# Patient Record
Sex: FEMALE | Race: WHITE | NOT HISPANIC OR LATINO | Employment: FULL TIME | URBAN - METROPOLITAN AREA
[De-identification: names, ages, dates, MRNs, and addresses within clinical notes are randomized per-mention and may not be internally consistent; named-entity substitution may affect disease eponyms.]

---

## 2017-03-10 ENCOUNTER — ALLSCRIPTS OFFICE VISIT (OUTPATIENT)
Dept: OTHER | Facility: OTHER | Age: 37
End: 2017-03-10

## 2017-03-16 LAB
HPV 18 (HISTORICAL): NOT DETECTED
HPV HIGH RISK 16/18 (HISTORICAL): NOT DETECTED
HPV16 (HISTORICAL): NOT DETECTED
PAP (HISTORICAL): NORMAL

## 2018-01-11 NOTE — PROGRESS NOTES
Plan  PMH: Cervical Loop Electrosurgical Excision (LEEP), Encounter for gynecological  examination without abnormal finding, Encounter for screening for human  papillomavirus (HPV), PMH: Severe dysplasia of cervix (JESE III)    · (B) PAP WITH HPV PLUS; Status: In Progress - Specimen/Data Collected,Retrospective  Authorization;   Done: [id number]   Perform:[employer]; Due:10Mar2018; Last Updated Gokul Blunt; 3/10/2017 9:53:10 AM;Ordered; [address]: Cervical Loop Electrosurgical Excision (LEEP), Encounter for gynecological examination without abnormal finding, Encounter for screening for human papillomavirus (HPV), PMH: Severe dysplasia of cervix (JESE III); Ordered By:Chantel May Marker;  : 2/28/2017    Discussion/Summary  health maintenance visit healthy adult female Currently, she eats a healthy diet and eats an adequate diet  the risks and benefits of cervical cancer screening were discussed cervical cancer screening is needed every year History of LEEP for cervical dysplasia Breast cancer screening: the risks and benefits of breast cancer screening were discussed and self breast exam technique was taught  Advice and education were given regarding nutrition, aerobic exercise, weight bearing exercise, calcium supplements and vitamin D supplements  Patient is a 58-year-old female, para 1, condoms for birth control, here for yearly gynecologic exam without abnormal findings  She has a history of a LEEP for cervical dysplasia  Pap with HPV was performed today  Patient has her hidradenitis under good control  Return in 12 months  Chief Complaint  Yearly-no complaints  History of Present Illness  HPI: Patient is a 58-year-old female, P1, here for yearly gynecologic exam with a complaint  Patient has a maternal aunt with postmenopausal breast cancer  GYN , Adult Female [facility]: The patient is being seen for a gynecology evaluation  General Health:   Lifestyle:  She exercises regularly   She exercises less than three times a week  She uses tobacco  The patient is a current cigarette smoker  Tobacco Use Duration: 0 5 cigarette pack(s) per day  She consumes alcohol  She reports occasional alcohol use  She typically drinks beer and wine  She denies drug use  Reproductive health:  she reports no menstrual problems  she uses contraception  For contraception, she uses condoms  she is sexually active  pregnancy history: G 3P 1, 2  Screening: Cervical cancer screening includes a pap smear performed 2016, no previous human papilloma virus screening and negative  Active Problems    1  Encounter for gynecological examination without abnormal finding (V72 31) (Z01 419)   2  Hidradenitis suppurativa (705 83) (L73 2)   3   Routine Gynecological Exam With Cervical Pap Smear (V72 31)    Past Medical History    · History of  (637 90)   · History of JESE III with severe dysplasia (233 1) (D06 9)   · History of Dysplasia of cervix, low grade (JESE 1) (622 11) (N87 0)   · Hidradenitis suppurativa (705 83) (L73 2)   · History of headache (V13 89) (G80 916)   · History of vacuum extraction assisted delivery (V45 89) (Z98 890)   · History of Mastodynia (611 71) (N64 4)   · History of Summary Of Previous Pregnancies  3  (Total No )   · History of  (spontaneous vaginal delivery) (650) (O80)    Surgical History    · History of Cervical Loop Electrosurgical Excision (LEEP)   · History of Colposcopy Cervix With Biopsy(S)   · History of Hand Surgery   · History of Oral Surgery Tooth Extraction   · History of Surgically Induced  - By Dilation And Evacuation    Family History  Mother    · Family history of hypertension (V17 49) (Z82 49)   · Family history of myocardial infarction (V17 3) (Z82 49)   · Family history of varicose veins (V17 49) (Z82 49)   · Family history of Stroke Syndrome (V17 1)  Child    · Family history of Shoulder dystocia  Maternal Grandmother    · Family history of  · Family history of Alzheimer's disease (V17 2) (Z82 0)   · Family history of dementia (V17 2) (Z81 8)   · Family history of varicose veins (V17 49) (Z82 49)  Maternal Aunt    · Family history of Breast Cancer (V16 3)   · Family history of Malignant Melanoma Of The Skin (V16 8)    Social History    · Being A Social Drinker   · Currently sexually active   · Drinks beer (V49 89) (Z78 9)   · Drinks coffee   · 1-2 cups a day   · Drinks wine (V49 89) (Z78 9)   · Denied: History of Drug Use   · Smokes 1/2 pack a day or less (305 1) (F17 210)   · Uses condoms    Current Meds   1  BP Gel 5 % External Gel; APPLY SPARINGLY ONCE DAILY; Therapy: 16HPF0925 to (Evaluate:24Mar2017)  Requested for: 03 91 12 17 13; Last   Rx:[id number] Ordered    Allergies    1  Sulfa Drugs    Vitals   Recorded: 95ACW2756 69:47ZN   Systolic 600, LUE, Sitting   Diastolic 70, LUE, Sitting   Height 5 ft 6 3 in   Weight 150 lb    BMI Calculated 23 99   BSA Calculated 1 78   LMP [id number]     Physical Exam    Constitutional   General appearance: No acute distress, well appearing and well nourished  Neck   Neck: Normal, supple, trachea midline, no masses  Thyroid: Normal, no thyromegaly  Pulmonary   Respiratory effort: No increased work of breathing or signs of respiratory distress  Auscultation of lungs: Clear to auscultation  Cardiovascular   Auscultation of heart: Normal rate and rhythm, normal S1 and S2, no murmurs  Peripheral vascular exam: Normal pulses Throughout  Genitourinary   External genitalia: Normal and no lesions appreciated  Vagina: Normal, no lesions or dryness appreciated  Urethra: Normal     Urethral meatus: Normal     Bladder: Normal, soft, non-tender and no prolapse or masses appreciated  Cervix: Normal, no palpable masses  Uterus: Normal, non-tender, not enlarged, and no palpable masses  Adnexa/parametria: Normal, non-tender and no fullness or masses appreciated      Anus, perineum, and rectum: Normal sphincter tone, no masses, and no prolapse  Chest   Breasts: Normal and no dimpling or skin changes noted  Abdomen   Abdomen: Normal, non-tender, and no organomegaly noted  Liver and spleen: No hepatomegaly or splenomegaly  Examination for hernias: No hernias appreciated  Stool sample for occult blood: Negative  Lymphatic   Palpation of lymph nodes in neck, axillae, groin and/or other locations: No lymphadenopathy or masses noted  Skin   Skin and subcutaneous tissue: Normal skin turgor and no rashes      Palpation of skin and subcutaneous tissue: Normal     Psychiatric   Orientation to person, place, and time: Normal     Mood and affect: Normal        Signatures   Electronically signed by : JACQUELINE Sol ; Mar 10 2017 10:08AM EST                       (Author)

## 2018-01-12 NOTE — PROGRESS NOTES
Assessment    1  Encounter for gynecological examination without abnormal finding (V72 31) (Z01 419)    Plan  PMH: Cervical Loop Electrosurgical Excision (LEEP), Encounter for gynecological  examination without abnormal finding    · (Q) THINPREP PAP; Status:Active - Retrospective Authorization; Requested  ELSIE:[id number];    Perform:[employer]; MALINDAZ:[id number]; Last Updated Abdirashid Hunt; 1/27/2016 2:12:37 PM;Ordered; [address]: Cervical Loop Electrosurgical Excision (LEEP), Encounter for gynecological examination without abnormal finding; Ordered By:Macy May;    Discussion/Summary  health maintenance visit healthy adult female Currently, she eats a healthy diet and eats an adequate diet  the risks and benefits of cervical cancer screening were discussed cervical cancer screening is current Pap test with reflex HPV testing was done today Breast cancer screening: the risks and benefits of breast cancer screening were discussed and self breast exam technique was taught  Advice and education were given regarding nutrition, aerobic exercise, weight bearing exercise, calcium supplements and vitamin D supplements  GYNECOLOGIC YEARLY WITHOUT ABNORMAL FINDINGS, PAP WITH REFLEX DONE, PATIENT DECLINES BIRTH CONTROL, RETURN IN 12 MONTHS  Chief Complaint  Yearly-no complaints  History of Present Illness  HPI: 37 YO P2, CONDOMS FOR BIRTH CONTROL, USING WICH ROCÍO FOR HER HYDRADINITIS AND BENZOIL PEROXIDE AND THAT WORKS WELL  CLINDAMYCIN IS TOO EXPENSIVE  GYN HM, Adult Female [address] [facility]: The patient is being seen for a gynecology evaluation  General Health:   Lifestyle:  She exercises regularly  She exercises less than three times a week physical therapy  She uses tobacco  The patient is a current cigarette smoker  Tobacco Use Duration: 0 5 cigarette pack(s) per day  She consumes alcohol  She reports occasional alcohol use  She typically drinks beer and wine  She denies drug use  Reproductive health: Tiago Zhao she reports no menstrual problems  she uses contraception  For contraception, she uses condoms  she is sexually active  pregnancy history: G 3P 1, 2(elective abortions: 1 )  Screening: Cervical cancer screening includes a pap smear performed 2014, human papilloma virus screening performed  and negative  Review of Systems  no pelvic pain, no pelvic pressure, no vaginal pain, no vaginal discharge, no vaginal itching, no vaginal lump or mass, no vaginal odor and no nonmenstrual bleeding  Active Problems    1  Hidradenitis suppurativa (705 83) (L73 2)   2   Routine Gynecological Exam With Cervical Pap Smear (V72 31)    Past Medical History    · History of  (637 90)   · History of JESE III with severe dysplasia (233 1) (D06 9)   · History of Dysplasia of cervix, low grade (JESE 1) (622 11) (N87 0)   · Hidradenitis suppurativa (705 83) (L73 2)   · History of headache (V13 89) (N09 243)   · History of vacuum extraction assisted delivery (V45 89) (Z98 89)   · History of Mastodynia (611 71) (N64 4)   · History of Summary Of Previous Pregnancies  3  (Total No )   · History of  (spontaneous vaginal delivery) (650) (O80)    Surgical History    · History of Cervical Loop Electrosurgical Excision (LEEP)   · History of Colposcopy Cervix With Biopsy(S)   · History of Hand Surgery   · History of Oral Surgery Tooth Extraction   · History of Surgically Induced  - By Dilation And Evacuation    Family History    · Family history of hypertension (V17 49) (Z82 49)   · Family history of myocardial infarction (V17 3) (Z82 49)   · Family history of varicose veins (V17 49) (Z82 49)   · Family history of Stroke Syndrome (V17 1)    · Family history of Shoulder dystocia    · Family history of    · Family history of Alzheimer's disease (V17 2) (Z82 0)   · Family history of dementia (V17 2) (Z81 8)   · Family history of varicose veins (V17 49) (Z82 49)    · Family history of Breast Cancer (V16 3)   · Family history of Malignant Melanoma Of The Skin (V16 8)    Social History    · Being A Social Drinker   · Currently sexually active   · Drinks beer (V49 89) (Z78 9)   · Drinks coffee   · 1-2 cups a day   · Drinks wine (V49 89) (Z78 9)   · Denied: History of Drug Use   · Smokes 1/2 pack a day or less (305 1) (F17 210)   · Uses condoms    Current Meds   1  Benzoyl Peroxide 5 % External Gel; APPLY SPARINGLY TO AFFECTED AREA(S) ONCE   DAILY; Therapy: 67BRM7822 to (Evaluate:08Jan2016)  Requested for: 57GIZ9119; Last   Rx:13Jan2015 Ordered    Allergies    1  Sulfa Drugs    Vitals   Recorded: 10WGV3928 50:35ZI   Systolic 931, RUE, Sitting   Diastolic 64, RUE, Sitting   Height 5 ft 6 in   Weight 128 lb    BMI Calculated 20 66   BSA Calculated 1 65   LMP [id number]     Physical Exam    Constitutional   General appearance: No acute distress, well appearing and well nourished  Neck   Neck: Normal, supple, trachea midline, no masses  Thyroid: Normal, no thyromegaly  Pulmonary   Respiratory effort: No increased work of breathing or signs of respiratory distress  Auscultation of lungs: Clear to auscultation  Cardiovascular   Auscultation of heart: Normal rate and rhythm, normal S1 and S2, no murmurs  Peripheral vascular exam: Normal pulses Throughout  Genitourinary   External genitalia: Normal and no lesions appreciated  Vagina: Normal, no lesions or dryness appreciated  Urethra: Normal     Urethral meatus: Normal     Bladder: Normal, soft, non-tender and no prolapse or masses appreciated  Cervix: Normal, no palpable masses  Uterus: Normal, non-tender, not enlarged, and no palpable masses  Adnexa/parametria: Normal, non-tender and no fullness or masses appreciated  Chest   Breasts: Normal and no dimpling or skin changes noted  Abdomen   Abdomen: Normal, non-tender, and no organomegaly noted  Liver and spleen: No hepatomegaly or splenomegaly      Examination for hernias: No hernias appreciated  Lymphatic   Palpation of lymph nodes in neck, axillae, groin and/or other locations: No lymphadenopathy or masses noted  Skin   Skin and subcutaneous tissue: Normal skin turgor and no rashes      Palpation of skin and subcutaneous tissue: Normal     Psychiatric   Orientation to person, place, and time: Normal     Mood and affect: Normal        Signatures   Electronically signed by : JACQUELINE Cotto ; Jan 27 2016  2:24PM EST                       (Author)

## 2018-01-14 VITALS
WEIGHT: 150 LBS | HEIGHT: 66 IN | DIASTOLIC BLOOD PRESSURE: 70 MMHG | SYSTOLIC BLOOD PRESSURE: 100 MMHG | BODY MASS INDEX: 24.11 KG/M2

## 2018-03-16 ENCOUNTER — OFFICE VISIT (OUTPATIENT)
Dept: OBGYN CLINIC | Facility: CLINIC | Age: 38
End: 2018-03-16
Payer: COMMERCIAL

## 2018-03-16 VITALS
HEIGHT: 66 IN | WEIGHT: 158 LBS | DIASTOLIC BLOOD PRESSURE: 68 MMHG | BODY MASS INDEX: 25.39 KG/M2 | SYSTOLIC BLOOD PRESSURE: 110 MMHG

## 2018-03-16 DIAGNOSIS — R06.2 WHEEZING ON RIGHT SIDE OF CHEST ON INHALATION: ICD-10-CM

## 2018-03-16 DIAGNOSIS — N64.4 BREAST PAIN: Primary | ICD-10-CM

## 2018-03-16 DIAGNOSIS — Z01.411 ENCOUNTER FOR GYNECOLOGICAL EXAMINATION (GENERAL) (ROUTINE) WITH ABNORMAL FINDINGS: ICD-10-CM

## 2018-03-16 DIAGNOSIS — K62.5 RECTAL BLEEDING: ICD-10-CM

## 2018-03-16 PROCEDURE — G0145 SCR C/V CYTO,THINLAYER,RESCR: HCPCS | Performed by: OBSTETRICS & GYNECOLOGY

## 2018-03-16 PROCEDURE — 99385 PREV VISIT NEW AGE 18-39: CPT | Performed by: OBSTETRICS & GYNECOLOGY

## 2018-03-16 PROCEDURE — 87624 HPV HI-RISK TYP POOLED RSLT: CPT | Performed by: OBSTETRICS & GYNECOLOGY

## 2018-03-16 NOTE — PROGRESS NOTES
Assessment/Plan:    Gynecologic yearly with non cyclic whole left breast pain, wheeze in the right lung, rectal bleeding  Pap  Referral to gastroenterology  Chest x-ray PA and lateral  Diagnostic mammogram of the left breast          Subjective:      Patient ID: Stephen Hair is a 44 y o  female  Patient is a 26-year-old female, P2, condoms, LEEP, hidradenitis, smoker here today for yearly gynecologic exam with a complaint intermittent whole left breast pain that is non cyclic,and small amount of bleeding from her anus  Patient is recovering from an upper respiratory infection  Past medical history is significant for smoking and hidradenitis  Past surgical history is significant for LEEP  Current medications none patient has an allergy to sulfa and steroids  Family history is significant for maternal aunt with breast cancer  Gynecologic Exam   The patient's pertinent negatives include no pelvic pain or vaginal discharge  Pertinent negatives include no abdominal pain  Review of Systems   Respiratory: Positive for cough and chest tightness  Negative for shortness of breath, wheezing and stridor  Cardiovascular: Negative for chest pain and palpitations  Gastrointestinal: Positive for anal bleeding  Negative for abdominal distention, abdominal pain and rectal pain  Genitourinary: Positive for genital sores  Negative for menstrual problem, pelvic pain, vaginal discharge and vaginal pain  Objective:      /68 (BP Location: Left arm, Patient Position: Sitting, Cuff Size: Standard)   Ht 5' 6" (1 676 m)   Wt 71 7 kg (158 lb)   LMP 02/22/2018 (Exact Date)   BMI 25 50 kg/m²          Physical Exam   Constitutional: She is oriented to person, place, and time  She appears well-developed and well-nourished  HENT:   Head: Normocephalic  Eyes: Pupils are equal, round, and reactive to light  Neck: Neck supple  No thyromegaly present     Cardiovascular: Normal rate, regular rhythm and normal heart sounds  Pulmonary/Chest: Effort normal  No respiratory distress  She has wheezes  She has no rales  Abdominal: Soft  She exhibits no distension and no mass  There is no tenderness  There is no rebound and no guarding  Genitourinary: Vagina normal and uterus normal  No vaginal discharge found  Lymphadenopathy:     She has no cervical adenopathy  Neurological: She is alert and oriented to person, place, and time  Skin: Skin is warm  Psychiatric: She has a normal mood and affect   Her behavior is normal  Judgment and thought content normal

## 2018-03-19 ENCOUNTER — HOSPITAL ENCOUNTER (OUTPATIENT)
Dept: RADIOLOGY | Facility: HOSPITAL | Age: 38
Discharge: HOME | End: 2018-03-19
Attending: OBSTETRICS & GYNECOLOGY
Payer: COMMERCIAL

## 2018-03-19 ENCOUNTER — TRANSCRIBE ORDERS (OUTPATIENT)
Dept: ADMINISTRATIVE | Facility: HOSPITAL | Age: 38
End: 2018-03-19

## 2018-03-19 ENCOUNTER — TELEPHONE (OUTPATIENT)
Dept: OBGYN CLINIC | Facility: CLINIC | Age: 38
End: 2018-03-19

## 2018-03-19 DIAGNOSIS — R06.2 WHEEZING ON RIGHT SIDE OF CHEST ON INHALATION: ICD-10-CM

## 2018-03-19 DIAGNOSIS — N64.4 BREAST PAIN: Primary | ICD-10-CM

## 2018-03-19 PROCEDURE — 71046 X-RAY EXAM CHEST 2 VIEWS: CPT

## 2018-03-19 NOTE — TELEPHONE ENCOUNTER
Pt called to schedule her aakash and told them she is having breast pain; the order was changed but the doctor needs to sign  pls sign  Thank you

## 2018-03-20 ENCOUNTER — HOSPITAL ENCOUNTER (OUTPATIENT)
Dept: MAMMOGRAPHY | Facility: CLINIC | Age: 38
Discharge: HOME | End: 2018-03-20
Payer: COMMERCIAL

## 2018-03-20 ENCOUNTER — HOSPITAL ENCOUNTER (OUTPATIENT)
Dept: ULTRASOUND IMAGING | Facility: CLINIC | Age: 38
Discharge: HOME | End: 2018-03-20
Payer: COMMERCIAL

## 2018-03-20 ENCOUNTER — TELEPHONE (OUTPATIENT)
Dept: OBGYN CLINIC | Facility: CLINIC | Age: 38
End: 2018-03-20

## 2018-03-20 DIAGNOSIS — N64.4 BREAST PAIN: ICD-10-CM

## 2018-03-20 DIAGNOSIS — N64.4 MASTODYNIA: ICD-10-CM

## 2018-03-20 DIAGNOSIS — N64.4 BREAST PAIN: Primary | ICD-10-CM

## 2018-03-20 LAB — HPV RRNA GENITAL QL NAA+PROBE: NORMAL

## 2018-03-20 PROCEDURE — 76642 ULTRASOUND BREAST LIMITED: CPT

## 2018-03-20 PROCEDURE — 77066 DX MAMMO INCL CAD BI: CPT

## 2018-03-21 ENCOUNTER — TELEPHONE (OUTPATIENT)
Dept: OBGYN CLINIC | Age: 38
End: 2018-03-21

## 2018-03-21 LAB
LAB AP GYN PRIMARY INTERPRETATION: NORMAL
LAB AP LMP: NORMAL
Lab: NORMAL

## 2018-03-21 NOTE — TELEPHONE ENCOUNTER
Spoke to patient on 03/21/2018 at 11:39 am to inform her that her Pap Smear and HPV result were negative

## 2018-03-30 ENCOUNTER — TELEPHONE (OUTPATIENT)
Dept: OBGYN CLINIC | Facility: CLINIC | Age: 38
End: 2018-03-30

## 2018-03-30 NOTE — TELEPHONE ENCOUNTER
----- Message from Dontae Amaya MD sent at 3/22/2018  4:44 PM EDT -----  Please notify patient her PAP was normal and her screen for HPV was negative

## 2018-04-24 ENCOUNTER — OFFICE VISIT (OUTPATIENT)
Dept: GASTROENTEROLOGY | Facility: AMBULARY SURGERY CENTER | Age: 38
End: 2018-04-24
Payer: COMMERCIAL

## 2018-04-24 VITALS
DIASTOLIC BLOOD PRESSURE: 68 MMHG | SYSTOLIC BLOOD PRESSURE: 116 MMHG | HEART RATE: 71 BPM | BODY MASS INDEX: 25.46 KG/M2 | HEIGHT: 66 IN | WEIGHT: 158.4 LBS | TEMPERATURE: 98.3 F

## 2018-04-24 DIAGNOSIS — K62.5 RECTAL BLEEDING: ICD-10-CM

## 2018-04-24 DIAGNOSIS — R10.9 RIGHT SIDED ABDOMINAL PAIN: ICD-10-CM

## 2018-04-24 DIAGNOSIS — K59.00 CONSTIPATION, UNSPECIFIED CONSTIPATION TYPE: Primary | ICD-10-CM

## 2018-04-24 PROCEDURE — 99244 OFF/OP CNSLTJ NEW/EST MOD 40: CPT | Performed by: INTERNAL MEDICINE

## 2018-04-24 RX ORDER — MELATONIN
1000 DAILY
COMMUNITY
End: 2021-06-30

## 2018-04-24 RX ORDER — ACETAMINOPHEN 325 MG/1
650 TABLET ORAL
COMMUNITY
End: 2020-06-24

## 2018-04-24 NOTE — LETTER
April 24, 2018     Zena Veliz MD  [address] [place] [id number]    Patient: Carlos Eduardo Cochran   YOB: 1980   Date of Visit: 4/24/2018       Dear Dr Brady Chapa: Thank you for referring Li Szymanski to me for evaluation  Below are my notes for this consultation  If you have questions, please do not hesitate to call me  I look forward to following your patient along with you           Sincerely,        Patsy Osborne MD        CC: No Recipients
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
## 2018-04-24 NOTE — LETTER
April 24, 2018     Noam Clark MD  [address] [place] [id number]    Patient: Monroe Ríos   YOB: 1980   Date of Visit: 4/24/2018       Dear Dr Nadine Perdue: Thank you for referring Efraín Clancy to me for evaluation  Below are my notes for this consultation  If you have questions, please do not hesitate to call me  I look forward to following your patient along with you           Sincerely,        Eva Nguyen MD        CC: No Recipients

## 2018-04-27 ENCOUNTER — TELEPHONE (OUTPATIENT)
Dept: GASTROENTEROLOGY | Facility: CLINIC | Age: 38
End: 2018-04-27

## 2018-05-10 ENCOUNTER — TELEPHONE (OUTPATIENT)
Dept: OBGYN CLINIC | Facility: MEDICAL CENTER | Age: 38
End: 2018-05-10

## 2018-05-10 ENCOUNTER — TELEPHONE (OUTPATIENT)
Dept: OBGYN CLINIC | Facility: CLINIC | Age: 38
End: 2018-05-10

## 2018-05-10 DIAGNOSIS — L73.2 HIDRADENITIS SUPPURATIVA: Primary | ICD-10-CM

## 2018-05-10 RX ORDER — CIPROFLOXACIN 500 MG/1
500 TABLET, FILM COATED ORAL EVERY 12 HOURS SCHEDULED
Qty: 14 TABLET | Refills: 0 | Status: SHIPPED | OUTPATIENT
Start: 2018-05-10 | End: 2018-05-17

## 2018-05-10 NOTE — TELEPHONE ENCOUNTER
We can give her Cipro 500 mg twice a day for 7 days  She can also use mupirocin ointment twice a day for 7 days  Please contact her  I will send the Rx were pharmacy

## 2018-05-10 NOTE — TELEPHONE ENCOUNTER
Patient lm that she is having an acute HS flare and is requesting an antibiotic-cannot take sulfa meds anymore-please advise

## 2018-05-11 NOTE — TELEPHONE ENCOUNTER
Notified patient, concerned about side effects, will try cipro and call with any questions or concerns

## 2018-06-05 ENCOUNTER — TRANSCRIBE ORDERS (OUTPATIENT)
Dept: ADMINISTRATIVE | Facility: HOSPITAL | Age: 38
End: 2018-06-05

## 2018-06-05 DIAGNOSIS — R10.9 RIGHT SIDED ABDOMINAL PAIN: Primary | ICD-10-CM

## 2018-06-11 ENCOUNTER — HOSPITAL ENCOUNTER (OUTPATIENT)
Dept: RADIOLOGY | Age: 38
Discharge: HOME | End: 2018-06-11
Payer: COMMERCIAL

## 2018-06-11 DIAGNOSIS — R10.9 RIGHT SIDED ABDOMINAL PAIN: ICD-10-CM

## 2018-06-11 PROCEDURE — 76705 ECHO EXAM OF ABDOMEN: CPT

## 2018-06-20 ENCOUNTER — TELEPHONE (OUTPATIENT)
Dept: GASTROENTEROLOGY | Facility: AMBULARY SURGERY CENTER | Age: 38
End: 2018-06-20

## 2018-06-20 NOTE — TELEPHONE ENCOUNTER
----- Message from Arin Armstrong MD sent at 6/19/2018  5:43 PM EDT -----  Please inform the patient that the ultrasound was normal

## 2019-03-29 ENCOUNTER — ANNUAL EXAM (OUTPATIENT)
Dept: OBGYN CLINIC | Facility: CLINIC | Age: 39
End: 2019-03-29
Payer: COMMERCIAL

## 2019-03-29 ENCOUNTER — APPOINTMENT (OUTPATIENT)
Dept: LAB | Facility: HOSPITAL | Age: 39
End: 2019-03-29
Attending: OBSTETRICS & GYNECOLOGY
Payer: COMMERCIAL

## 2019-03-29 VITALS
SYSTOLIC BLOOD PRESSURE: 110 MMHG | DIASTOLIC BLOOD PRESSURE: 68 MMHG | RESPIRATION RATE: 14 BRPM | WEIGHT: 164 LBS | HEIGHT: 66 IN | BODY MASS INDEX: 26.36 KG/M2

## 2019-03-29 DIAGNOSIS — Z01.411 ENCOUNTER FOR GYNECOLOGICAL EXAMINATION (GENERAL) (ROUTINE) WITH ABNORMAL FINDINGS: Primary | ICD-10-CM

## 2019-03-29 DIAGNOSIS — L73.2 HIDRADENITIS SUPPURATIVA: ICD-10-CM

## 2019-03-29 PROCEDURE — 57500 BIOPSY OF CERVIX: CPT | Performed by: OBSTETRICS & GYNECOLOGY

## 2019-03-29 PROCEDURE — 88305 TISSUE EXAM BY PATHOLOGIST: CPT | Performed by: PATHOLOGY

## 2019-03-29 PROCEDURE — 99395 PREV VISIT EST AGE 18-39: CPT | Performed by: OBSTETRICS & GYNECOLOGY

## 2019-03-29 RX ORDER — CLINDAMYCIN PHOSPHATE 10 MG/G
GEL TOPICAL 2 TIMES DAILY
Qty: 30 G | Refills: 0 | Status: SHIPPED | OUTPATIENT
Start: 2019-03-29 | End: 2019-11-19 | Stop reason: SDUPTHER

## 2019-03-29 NOTE — PROGRESS NOTES
Assessment/Plan:      Diagnoses and all orders for this visit:    Encounter for gynecological examination (general) (routine) with abnormal findings          Subjective:     Patient ID: Yariel Nichole is a 43 y o  female  Patient is a 70-year-old female, para 2, condoms, LEEP, here for yearly gynecologic exam without complaint  Patient requests refill on her hidradenitis medications  Having said that her hidradenitis is in remission at this time  Review of systems is negative for vulvar vaginal or anal irritation  Negative for pelvic or abdominal pain  Negative for breast complaints  Negative for blood in her stool or urine  Past medical history significant for hidradenitis suppurativa and smoking  He past surgical history significant for LEEP  Current medications none  Patient has an allergy to sulfa drugs  Family history significant for maternal aunt with breast cancer  Review of Systems   Constitutional: Negative for fever  Gastrointestinal: Negative for abdominal pain and rectal pain  Genitourinary: Negative for genital sores, menstrual problem, pelvic pain, vaginal discharge and vaginal pain  Objective:     Physical Exam   Constitutional: She is oriented to person, place, and time  She appears well-developed and well-nourished  HENT:   Head: Normocephalic  Neck: No thyromegaly present  Cardiovascular: Normal rate, regular rhythm and normal heart sounds  Exam reveals no gallop and no friction rub  No murmur heard  Pulmonary/Chest: Effort normal and breath sounds normal  No stridor  No respiratory distress  She has no wheezes  She has no rales  Abdominal: Soft  She exhibits no distension and no mass  There is no tenderness  There is no rebound and no guarding  No hernia  Genitourinary: Vagina normal and uterus normal        Neurological: She is alert and oriented to person, place, and time  Skin: Skin is warm  Psychiatric: She has a normal mood and affect   Her behavior is normal

## 2019-04-05 ENCOUNTER — TELEPHONE (OUTPATIENT)
Dept: OBGYN CLINIC | Facility: CLINIC | Age: 39
End: 2019-04-05

## 2019-11-19 DIAGNOSIS — L73.2 HIDRADENITIS SUPPURATIVA: ICD-10-CM

## 2019-11-19 NOTE — TELEPHONE ENCOUNTER
----- Message from [address] [place] [address] sent at 11/19/2019 10:49 AM EST -----  Regarding: Referral Request  Contact: [phone]  Hello,    Its time for another mammo-i received a letter at home as well as one on here  It says a physicians order, prescription or referral is required for this exam  Can you do that for me so I can schedule this? I also know they said I need a specific test this time? 3D maybe I forget exactly because I have dense breasts  Hopefully you know what I'm talking about as I'm not sure if that needs to be specified on the order      Thanks so much

## 2019-11-21 RX ORDER — CLINDAMYCIN PHOSPHATE 10 MG/G
GEL TOPICAL 2 TIMES DAILY
Qty: 30 G | Refills: 0 | Status: SHIPPED | OUTPATIENT
Start: 2019-11-21 | End: 2021-02-04 | Stop reason: SDUPTHER

## 2019-12-19 ENCOUNTER — TELEPHONE (OUTPATIENT)
Dept: OBGYN CLINIC | Facility: CLINIC | Age: 39
End: 2019-12-19

## 2019-12-19 DIAGNOSIS — Z12.31 VISIT FOR SCREENING MAMMOGRAM: Primary | ICD-10-CM

## 2019-12-19 NOTE — TELEPHONE ENCOUNTER
----- Message from [address] [place] [address] sent at 12/19/2019  2:41 PM EST -----  Regarding: Referral Request  Contact: [phone]  Hello,  per the letter sent to my chart it says I need a referral for my mammo appoint  Below it is scheduled can you please do a referral for me, thank you !!  Happy Holidays  Appointment Information:   Visit Type: Mammo Screening   Date: 2/25/2020   Dept: [address] [place] [address]   Provider: Rosalina BARAJAS MAMMO ROOM 1   Time: 3:10 PM

## 2019-12-19 NOTE — TELEPHONE ENCOUNTER
----- Message from [address] [place] [address] sent at 12/19/2019  2:41 PM EST -----  Regarding: Referral Request  Contact: [phone]  Redd,  per the letter sent to my chart it says I need a referral for my mammo appoint  Below it is scheduled can you please do a referral for me, thank you !!  Happy Holidays  Appointment Information:   Visit Type: Mammo Screening   Date: 2/25/2020   Dept: [address] [place] [address]   Provider: [facility] MAMMO ROOM 1   Time: 3:10 PM

## 2020-02-28 ENCOUNTER — TELEPHONE (OUTPATIENT)
Dept: OBGYN CLINIC | Facility: CLINIC | Age: 40
End: 2020-02-28

## 2020-02-28 NOTE — TELEPHONE ENCOUNTER
----- Message from Julito Lay MD sent at 2/28/2020 12:29 PM EST -----  Please let Marlena know the good news that her mammogram was normal, plan for routine screening in one year    Thanks! -AMM

## 2020-02-28 NOTE — TELEPHONE ENCOUNTER
Called and spoke to pt informing her that her mammogram was normal, and that she can plan for routine screening in one year  Pt was very grateful for the call and confirmed understanding

## 2020-06-24 ENCOUNTER — ANNUAL EXAM (OUTPATIENT)
Dept: OBGYN CLINIC | Facility: CLINIC | Age: 40
End: 2020-06-24
Payer: COMMERCIAL

## 2020-06-24 VITALS
BODY MASS INDEX: 25.75 KG/M2 | SYSTOLIC BLOOD PRESSURE: 104 MMHG | WEIGHT: 160.2 LBS | HEIGHT: 66 IN | DIASTOLIC BLOOD PRESSURE: 84 MMHG | TEMPERATURE: 99.2 F

## 2020-06-24 DIAGNOSIS — Z11.51 SCREENING FOR HPV (HUMAN PAPILLOMAVIRUS): ICD-10-CM

## 2020-06-24 DIAGNOSIS — Z12.31 ENCOUNTER FOR SCREENING MAMMOGRAM FOR MALIGNANT NEOPLASM OF BREAST: ICD-10-CM

## 2020-06-24 DIAGNOSIS — Z98.890 HISTORY OF LOOP ELECTRICAL EXCISION PROCEDURE (LEEP): ICD-10-CM

## 2020-06-24 DIAGNOSIS — Z01.419 WELL FEMALE EXAM WITH ROUTINE GYNECOLOGICAL EXAM: Primary | ICD-10-CM

## 2020-06-24 PROCEDURE — S0612 ANNUAL GYNECOLOGICAL EXAMINA: HCPCS | Performed by: STUDENT IN AN ORGANIZED HEALTH CARE EDUCATION/TRAINING PROGRAM

## 2020-06-24 PROCEDURE — G0145 SCR C/V CYTO,THINLAYER,RESCR: HCPCS | Performed by: STUDENT IN AN ORGANIZED HEALTH CARE EDUCATION/TRAINING PROGRAM

## 2020-06-24 PROCEDURE — 87624 HPV HI-RISK TYP POOLED RSLT: CPT | Performed by: STUDENT IN AN ORGANIZED HEALTH CARE EDUCATION/TRAINING PROGRAM

## 2020-06-26 LAB
HPV HR 12 DNA CVX QL NAA+PROBE: NEGATIVE
HPV16 DNA CVX QL NAA+PROBE: NEGATIVE
HPV18 DNA CVX QL NAA+PROBE: NEGATIVE
LAB AP GYN PRIMARY INTERPRETATION: NORMAL
Lab: NORMAL

## 2020-06-30 ENCOUNTER — TELEPHONE (OUTPATIENT)
Dept: OBGYN CLINIC | Facility: CLINIC | Age: 40
End: 2020-06-30

## 2021-02-04 DIAGNOSIS — L73.2 HIDRADENITIS SUPPURATIVA: ICD-10-CM

## 2021-02-05 RX ORDER — CLINDAMYCIN PHOSPHATE 10 MG/G
GEL TOPICAL 2 TIMES DAILY
Qty: 60 G | Refills: 1 | Status: SHIPPED | OUTPATIENT
Start: 2021-02-05

## 2021-03-19 ENCOUNTER — HOSPITAL ENCOUNTER (OUTPATIENT)
Dept: RADIOLOGY | Age: 41
Discharge: HOME | End: 2021-03-19
Payer: COMMERCIAL

## 2021-03-19 VITALS — WEIGHT: 168 LBS | HEIGHT: 66 IN | BODY MASS INDEX: 27 KG/M2

## 2021-03-19 DIAGNOSIS — Z12.31 ENCOUNTER FOR SCREENING MAMMOGRAM FOR MALIGNANT NEOPLASM OF BREAST: ICD-10-CM

## 2021-03-19 PROCEDURE — 77063 BREAST TOMOSYNTHESIS BI: CPT

## 2021-03-19 PROCEDURE — 77067 SCR MAMMO BI INCL CAD: CPT

## 2021-06-30 ENCOUNTER — ANNUAL EXAM (OUTPATIENT)
Dept: OBGYN CLINIC | Facility: CLINIC | Age: 41
End: 2021-06-30
Payer: COMMERCIAL

## 2021-06-30 VITALS — BODY MASS INDEX: 28.21 KG/M2 | DIASTOLIC BLOOD PRESSURE: 78 MMHG | SYSTOLIC BLOOD PRESSURE: 118 MMHG | WEIGHT: 174.8 LBS

## 2021-06-30 DIAGNOSIS — Z12.31 SCREENING MAMMOGRAM, ENCOUNTER FOR: ICD-10-CM

## 2021-06-30 DIAGNOSIS — Z01.419 ENCOUNTER FOR GYNECOLOGICAL EXAMINATION: Primary | ICD-10-CM

## 2021-06-30 DIAGNOSIS — Z98.890 HISTORY OF LOOP ELECTRICAL EXCISION PROCEDURE (LEEP): ICD-10-CM

## 2021-06-30 PROCEDURE — G0145 SCR C/V CYTO,THINLAYER,RESCR: HCPCS | Performed by: STUDENT IN AN ORGANIZED HEALTH CARE EDUCATION/TRAINING PROGRAM

## 2021-06-30 PROCEDURE — S0612 ANNUAL GYNECOLOGICAL EXAMINA: HCPCS | Performed by: STUDENT IN AN ORGANIZED HEALTH CARE EDUCATION/TRAINING PROGRAM

## 2021-06-30 PROCEDURE — G0476 HPV COMBO ASSAY CA SCREEN: HCPCS | Performed by: STUDENT IN AN ORGANIZED HEALTH CARE EDUCATION/TRAINING PROGRAM

## 2021-06-30 RX ORDER — TIZANIDINE 2 MG/1
TABLET ORAL
COMMUNITY
Start: 2021-06-04

## 2021-06-30 RX ORDER — DOXYCYCLINE HYCLATE 100 MG/1
100 CAPSULE ORAL DAILY
COMMUNITY
Start: 2021-05-05

## 2021-06-30 RX ORDER — ACETAMINOPHEN 500 MG
1000 TABLET ORAL
COMMUNITY

## 2021-06-30 NOTE — ASSESSMENT & PLAN NOTE
History LEEP JESE III  2016 NILM  2017 NILM  2018 NILM HPV neg  2019 colpo low grade dysplasia  2020 NILM / HPV neg    Pap smear collected today per patient request
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
## 2021-06-30 NOTE — PROGRESS NOTES
Patient presents for a routine annual visit  Menarche- 14 Y/O  Last Pap Smear- 20 Neg-HPV  LMP-  Birth control-condoms  Mammogram-3/19/21 WNL    Current every day smoker  Social drinker  Currently sexually active  Family history of  breast cancer       No concerns/questions for today's visit

## 2021-07-02 LAB
HPV HR 12 DNA CVX QL NAA+PROBE: NEGATIVE
HPV16 DNA CVX QL NAA+PROBE: NEGATIVE
HPV18 DNA CVX QL NAA+PROBE: NEGATIVE

## 2021-07-07 LAB
LAB AP GYN PRIMARY INTERPRETATION: NORMAL
Lab: NORMAL

## 2022-05-11 ENCOUNTER — HOSPITAL ENCOUNTER (OUTPATIENT)
Dept: RADIOLOGY | Age: 42
Discharge: HOME | End: 2022-05-11
Payer: COMMERCIAL

## 2022-05-11 VITALS — HEIGHT: 66 IN | BODY MASS INDEX: 28.09 KG/M2 | WEIGHT: 174.8 LBS

## 2022-05-11 DIAGNOSIS — Z12.31 ENCOUNTER FOR SCREENING MAMMOGRAM FOR MALIGNANT NEOPLASM OF BREAST: ICD-10-CM

## 2022-05-11 DIAGNOSIS — Z12.31 SCREENING MAMMOGRAM, ENCOUNTER FOR: ICD-10-CM

## 2022-05-11 PROCEDURE — 77067 SCR MAMMO BI INCL CAD: CPT

## 2022-05-11 PROCEDURE — 77063 BREAST TOMOSYNTHESIS BI: CPT

## 2022-09-28 ENCOUNTER — ANNUAL EXAM (OUTPATIENT)
Dept: OBGYN CLINIC | Facility: CLINIC | Age: 42
End: 2022-09-28
Payer: COMMERCIAL

## 2022-09-28 VITALS
HEIGHT: 66 IN | BODY MASS INDEX: 28.87 KG/M2 | SYSTOLIC BLOOD PRESSURE: 120 MMHG | WEIGHT: 179.6 LBS | DIASTOLIC BLOOD PRESSURE: 82 MMHG

## 2022-09-28 DIAGNOSIS — N92.0 MENORRHAGIA WITH REGULAR CYCLE: ICD-10-CM

## 2022-09-28 DIAGNOSIS — Z01.419 ENCNTR FOR GYN EXAM (GENERAL) (ROUTINE) W/O ABN FINDINGS: Primary | ICD-10-CM

## 2022-09-28 DIAGNOSIS — Z12.31 ENCOUNTER FOR SCREENING MAMMOGRAM FOR MALIGNANT NEOPLASM OF BREAST: ICD-10-CM

## 2022-09-28 PROCEDURE — G0145 SCR C/V CYTO,THINLAYER,RESCR: HCPCS | Performed by: PATHOLOGY

## 2022-09-28 PROCEDURE — S0612 ANNUAL GYNECOLOGICAL EXAMINA: HCPCS | Performed by: OBSTETRICS & GYNECOLOGY

## 2022-09-28 PROCEDURE — 87624 HPV HI-RISK TYP POOLED RSLT: CPT | Performed by: OBSTETRICS & GYNECOLOGY

## 2022-09-28 NOTE — PROGRESS NOTES
Everardo Berriosa  1980      CC:  Yearly exam    S:  42 y o  female here for yearly exam  Tracking her cycles in an ashwin  Flow is heavier now than it used to be, for the last two years  Some clots  It does occur on a regular frequency  On heaviest day - sometimes will need to change hourly x 2 hours on a heavy day  Son is almost 24, Daughter is 15  Has HS  Noticed bump near clitoral black - used triple antibiotic ointment that has helped  Was not painful  She denies vaginal discharge, itching, pelvic pain  She has not have urinary concerns, does not have incontinence  No bowel concerns  No breast concerns  Sexual activity: She is sexually active without pain, bleeding or dryness- although not currently as she is single  She is not interested in STD screening today  Contraception: She uses condoms  for contraception  Last Pap: LEEP many years ago  (HPV )   Last Mammo: 21 - BIRad 1    We reviewed [facility]P guidelines for Pap testing today       Family hx of breast cancer: M Aunt  Family hx of ovarian cancer: no  Family hx of colon cancer: no      Current Outpatient Medications:     acetaminophen (TYLENOL) 500 mg tablet, Take 1,000 mg by mouth, Disp: , Rfl:     clindamycin (CLINDAGEL) 1 % gel, Apply topically 2 (two) times a day, Disp: 60 g, Rfl: 1    doxycycline hyclate (VIBRAMYCIN) 100 mg capsule, Take 100 mg by mouth daily Take with food, Disp: , Rfl:     tiZANidine (ZANAFLEX) 2 mg tablet, , Disp: , Rfl:   Patient Active Problem List   Diagnosis    Rectal bleeding    Right sided abdominal pain    Constipation    History of loop electrical excision procedure (LEEP)     Past Medical History:   Diagnosis Date         JESE III with severe dysplasia     Dysplasia of cervix, low grade (JESE 1)     Hidradenitis suppurativa     last assessed 13    Mastodynia     Severe dysplasia of cervix      Family History   Problem Relation Age of Onset    Hypertension Mother     Heart attack Mother     Varicose Veins Mother     Stroke Mother     Alzheimer's disease Maternal Grandmother     Dementia Maternal Grandmother     Varicose Veins Maternal Grandmother     Breast cancer Maternal Aunt         unknown age-with mets    Melanoma Maternal Aunt         Malignant -- Skin    No Known Problems Father     No Known Problems Daughter     Lung cancer Maternal Grandfather         unknown age   Yang Rahman No Known Problems Paternal Grandmother     No Known Problems Paternal Grandfather     No Known Problems Son     No Known Problems Paternal Aunt     No Known Problems Paternal Aunt           Review of Systems   Respiratory: Negative  Cardiovascular: Negative  Gastrointestinal: Negative for constipation and diarrhea  O:  Blood pressure 120/82, height 5' 5 5" (1 664 m), weight 81 5 kg (179 lb 9 6 oz), last menstrual period 09/21/2022, not currently breastfeeding  Patient appears well and is not in distress  Breasts are symmetrical without mass, tenderness, nipple discharge, skin changes or adenopathy  Abdomen is soft and nontender without masses  External genitals are normal without lesions or rashes  Urethral meatus and urethra are normal  Bladder is normal to palpation  Vagina is normal without discharge or bleeding  Cervix is normal without discharge or lesion  Uterus is normal, mobile, nontender without palpable mass  Adnexa are normal, nontender, without palpable mass  A:  Yearly exam      P:   Pap (with reflex) - per patient request    Mammo ordered   Will check pelvic US to eval anatomy in light of her heavy periods - briefly discussed causes and options for tx (Lysteda, Hormonal, Ablation)    RTO one year for yearly exam or sooner as needed

## 2022-10-04 ENCOUNTER — HOSPITAL ENCOUNTER (OUTPATIENT)
Dept: RADIOLOGY | Age: 42
Discharge: HOME | End: 2022-10-04
Payer: COMMERCIAL

## 2022-10-04 DIAGNOSIS — N92.0 MENORRHAGIA WITH REGULAR CYCLE: ICD-10-CM

## 2022-10-04 PROCEDURE — 76830 TRANSVAGINAL US NON-OB: CPT

## 2022-10-04 PROCEDURE — 76856 US EXAM PELVIC COMPLETE: CPT

## 2022-10-10 LAB
LAB AP GYN PRIMARY INTERPRETATION: ABNORMAL
Lab: ABNORMAL
PATH INTERP SPEC-IMP: ABNORMAL

## 2022-10-13 ENCOUNTER — TELEPHONE (OUTPATIENT)
Dept: OBGYN CLINIC | Facility: CLINIC | Age: 42
End: 2022-10-13

## 2022-10-13 NOTE — TELEPHONE ENCOUNTER
Spoke to pt and reviewed results and recommendations from their pap and US per  CT   All WNL/NEG  appt if want to discuss concerns

## 2022-10-13 NOTE — TELEPHONE ENCOUNTER
----- Message from Goyo Payton MD sent at 10/13/2022 12:23 PM EDT -----  Please let Butch Suazo know that her ultrasound is without any significant concerns  If she would like to pursue any additional treatment for her cycles please let me know

## 2023-05-13 ENCOUNTER — HOSPITAL ENCOUNTER (OUTPATIENT)
Dept: RADIOLOGY | Age: 43
Discharge: HOME | End: 2023-05-13

## 2023-05-13 VITALS — BODY MASS INDEX: 29.57 KG/M2 | HEIGHT: 66 IN | WEIGHT: 184 LBS

## 2023-05-13 DIAGNOSIS — Z12.31 ENCOUNTER FOR SCREENING MAMMOGRAM FOR MALIGNANT NEOPLASM OF BREAST: ICD-10-CM

## 2023-11-30 ENCOUNTER — ANNUAL EXAM (OUTPATIENT)
Dept: OBGYN CLINIC | Facility: CLINIC | Age: 43
End: 2023-11-30
Payer: COMMERCIAL

## 2023-11-30 VITALS
BODY MASS INDEX: 30.92 KG/M2 | HEIGHT: 66 IN | WEIGHT: 192.4 LBS | SYSTOLIC BLOOD PRESSURE: 110 MMHG | DIASTOLIC BLOOD PRESSURE: 72 MMHG

## 2023-11-30 DIAGNOSIS — Z01.419 ENCOUNTER FOR GYNECOLOGICAL EXAMINATION WITHOUT ABNORMAL FINDING: Primary | ICD-10-CM

## 2023-11-30 PROCEDURE — 99396 PREV VISIT EST AGE 40-64: CPT | Performed by: OBSTETRICS & GYNECOLOGY

## 2023-12-01 NOTE — PROGRESS NOTES
Sofia Amato  1980      CC:  Yearly exam    S:  39 y.o. female here for yearly exam. Her cycles are regular, not heavy or crampy. Sexual activity: She is not sexually active currently. Contraception: She uses abstinence  for contraception. STD testing:  She does not want STD testing today. Last Pap 9/28/2022 - [facility], negative HPV; explained significance of this and recommended follow up; hx LEEP ~20 years ago  Last Mammo 5/13/2023 - BIRAD-1    We reviewed [facility] guidelines for Pap testing today. Current Outpatient Medications:     acetaminophen (TYLENOL) 500 mg tablet, Take 1,000 mg by mouth, Disp: , Rfl:     clindamycin (CLINDAGEL) 1 % gel, Apply topically 2 (two) times a day, Disp: 60 g, Rfl: 1    tiZANidine (ZANAFLEX) 2 mg tablet, , Disp: , Rfl:   Social History     Socioeconomic History    Marital status: Single     Spouse name: Not on file    Number of children: Not on file    Years of education: Not on file    Highest education level: Not on file   Occupational History    Not on file   Tobacco Use    Smoking status: Every Day     Packs/day: 0.50     Years: 15.00     Total pack years: 7.50     Types: Cigarettes    Smokeless tobacco: Never   Vaping Use    Vaping Use: Never used   Substance and Sexual Activity    Alcohol use:  Yes     Alcohol/week: 2.0 standard drinks of alcohol     Types: 2 Standard drinks or equivalent per week     Comment: Drinks Beer and Wine    Drug use: No    Sexual activity: Not Currently     Partners: Male     Birth control/protection: Condom Male   Other Topics Concern    Not on file   Social History Narrative    Drinks Coffee: 1-2 cup/day     Social Determinants of Health     Financial Resource Strain: Not on file   Food Insecurity: Not on file   Transportation Needs: Not on file   Physical Activity: Not on file   Stress: Not on file   Social Connections: Not on file   Intimate Partner Violence: Not on file   Housing Stability: Not on file     Family History   Problem Relation Age of Onset    Hypertension Mother     Heart attack Mother     Varicose Veins Mother     Stroke Mother     Breast cancer Mother         Not my mother, my mother’s sister    Alzheimer's disease Maternal Grandmother     Dementia Maternal Grandmother     Varicose Veins Maternal Grandmother     Breast cancer Maternal Aunt         unknown age-with mets    Melanoma Maternal Aunt         Malignant -- Skin    No Known Problems Father     No Known Problems Daughter     Lung cancer Maternal Grandfather         unknown age    No Known Problems Paternal Grandmother     No Known Problems Paternal Grandfather     No Known Problems Son     No Known Problems Paternal Aunt     No Known Problems Paternal Aunt       Past Medical History:   Diagnosis Date    Abnormal Pap smear of cervix          JESE III with severe dysplasia     Dysplasia of cervix, low grade (JESE 1)     Hidradenitis suppurativa     last assessed 13    Mastodynia     Severe dysplasia of cervix         Review of Systems   Respiratory: Negative. Cardiovascular: Negative. Gastrointestinal: Negative for constipation and diarrhea. Genitourinary: Negative for difficulty urinating, pelvic pain, vaginal bleeding, vaginal discharge, itching or odor. O:  Blood pressure 110/72, height 5' 5.5" (1.664 m), weight 87.3 kg (192 lb 6.4 oz), last menstrual period 2023, not currently breastfeeding. Patient appears well and is not in distress  Neck is supple without masses  Breasts are symmetrical without mass, tenderness, nipple discharge, skin changes or adenopathy. Abdomen is soft and nontender without masses. External genitals are normal without lesions or rashes. Urethral meatus and urethra are normal  Bladder is normal to palpation  Vagina is normal without discharge or bleeding. Cervix is normal without discharge or lesion. Uterus is normal, mobile, nontender without palpable mass.   Adnexa are normal, nontender, without palpable mass. A:   Yearly exam.     P:   Pap due 2025   Mammo slip provided     RTO one year for yearly exam or sooner as needed.

## 2024-03-28 DIAGNOSIS — Z00.6 ENCOUNTER FOR EXAMINATION FOR NORMAL COMPARISON OR CONTROL IN CLINICAL RESEARCH PROGRAM: ICD-10-CM

## 2024-05-18 ENCOUNTER — HOSPITAL ENCOUNTER (OUTPATIENT)
Dept: RADIOLOGY | Age: 44
Discharge: HOME | End: 2024-05-18
Payer: COMMERCIAL

## 2024-05-18 DIAGNOSIS — Z12.31 VISIT FOR SCREENING MAMMOGRAM: ICD-10-CM

## 2024-05-18 PROCEDURE — 77067 SCR MAMMO BI INCL CAD: CPT

## 2024-05-18 PROCEDURE — 77063 BREAST TOMOSYNTHESIS BI: CPT

## 2024-06-01 ENCOUNTER — APPOINTMENT (OUTPATIENT)
Dept: LAB | Age: 44
End: 2024-06-01

## 2024-06-01 DIAGNOSIS — Z00.6 ENCOUNTER FOR EXAMINATION FOR NORMAL COMPARISON OR CONTROL IN CLINICAL RESEARCH PROGRAM: ICD-10-CM

## 2024-06-01 PROCEDURE — 36415 COLL VENOUS BLD VENIPUNCTURE: CPT

## 2024-06-17 LAB
APOB+LDLR+PCSK9 GENE MUT ANL BLD/T: NOT DETECTED
BRCA1+BRCA2 DEL+DUP + FULL MUT ANL BLD/T: NOT DETECTED
MLH1+MSH2+MSH6+PMS2 GN DEL+DUP+FUL M: NOT DETECTED

## 2024-07-03 ENCOUNTER — HOSPITAL ENCOUNTER (OUTPATIENT)
Dept: MAMMOGRAPHY | Facility: CLINIC | Age: 44
Discharge: HOME | End: 2024-07-03
Payer: COMMERCIAL

## 2024-07-03 ENCOUNTER — HOSPITAL ENCOUNTER (OUTPATIENT)
Dept: ULTRASOUND IMAGING | Facility: CLINIC | Age: 44
Discharge: HOME | End: 2024-07-03
Payer: COMMERCIAL

## 2024-07-03 VITALS — DIASTOLIC BLOOD PRESSURE: 81 MMHG | SYSTOLIC BLOOD PRESSURE: 118 MMHG | HEART RATE: 76 BPM

## 2024-07-03 VITALS — HEIGHT: 66 IN | WEIGHT: 192 LBS | BODY MASS INDEX: 30.86 KG/M2

## 2024-07-03 DIAGNOSIS — R92.8 ABNORMAL MAMMOGRAM: ICD-10-CM

## 2024-07-03 DIAGNOSIS — R92.8 ABNORMAL SCREENING MAMMOGRAM: ICD-10-CM

## 2024-07-03 PROCEDURE — G0279 TOMOSYNTHESIS, MAMMO: HCPCS

## 2024-07-03 PROCEDURE — 77065 DX MAMMO INCL CAD UNI: CPT

## 2024-07-03 PROCEDURE — A4648 IMPLANTABLE TISSUE MARKER: HCPCS

## 2024-07-03 PROCEDURE — 88305 TISSUE EXAM BY PATHOLOGIST: CPT | Performed by: PATHOLOGY

## 2024-07-03 PROCEDURE — 19083 BX BREAST 1ST LESION US IMAG: CPT

## 2024-07-03 PROCEDURE — 76642 ULTRASOUND BREAST LIMITED: CPT

## 2024-07-03 RX ORDER — LIDOCAINE HYDROCHLORIDE 10 MG/ML
5 INJECTION, SOLUTION EPIDURAL; INFILTRATION; INTRACAUDAL; PERINEURAL ONCE
Status: COMPLETED | OUTPATIENT
Start: 2024-07-03 | End: 2024-07-03

## 2024-07-03 RX ADMIN — LIDOCAINE HYDROCHLORIDE 5 ML: 10 INJECTION, SOLUTION EPIDURAL; INFILTRATION; INTRACAUDAL; PERINEURAL at 08:38

## 2024-07-03 NOTE — PROGRESS NOTES
Procedure type:    ___x__ultrasound guided _____stereotactic    Breast:    __x___Left _____Right    Location: 11 o'clock 8 cmfn    Needle: 12g    # of passes: 3    Clip: Ribbon    Performed by: Dr. Carter    Pressure held for 5 minutes by: Suni Pappas Strips:    ___X__yes _____no    Band aid:    __X___yes_____no    Tolerated procedure:    __X___yes _____no

## 2024-07-03 NOTE — PROGRESS NOTES
Met with patient and Dr. Carter regarding recommendation for;    _____ RIGHT ___X___LEFT      __X___Ultrasound guided  ______Stereotactic breast biopsy.      __X___Verbalized understanding.      Blood thinners:  No: __X___ Yes: ______ What:                 Biopsy teaching sheet given:  Yes: ___X___ No: ________    Pt given contact information and adv to call with any questions/needs    Patient was a same day add on biopsy.

## 2024-07-05 ENCOUNTER — TELEPHONE (OUTPATIENT)
Dept: MAMMOGRAPHY | Facility: CLINIC | Age: 44
End: 2024-07-05

## 2024-07-05 PROCEDURE — 88305 TISSUE EXAM BY PATHOLOGIST: CPT | Performed by: PATHOLOGY

## 2024-07-05 NOTE — PROGRESS NOTES
Post procedure call completed    Bleeding: _____yes __X___no    Pain: _____yes ___X___no    Redness/Swelling: ______yes ___X___no    Band aid removed: ___X_yes _____no (discussed removing when she showers)    Steri-Strips intact: ___X___yes _____no (discussed with patient to remove steri strips on ... if they have not come off on their own)    Pt with no questions at this time, adv will call when results available, adv to call with any questions or concerns, has name/# for contact

## 2025-01-29 ENCOUNTER — ANNUAL EXAM (OUTPATIENT)
Dept: OBGYN CLINIC | Facility: CLINIC | Age: 45
End: 2025-01-29
Payer: COMMERCIAL

## 2025-01-29 VITALS
SYSTOLIC BLOOD PRESSURE: 100 MMHG | HEIGHT: 66 IN | BODY MASS INDEX: 31.82 KG/M2 | DIASTOLIC BLOOD PRESSURE: 70 MMHG | WEIGHT: 198 LBS

## 2025-01-29 DIAGNOSIS — Z01.419 ENCNTR FOR GYN EXAM (GENERAL) (ROUTINE) W/O ABN FINDINGS: Primary | ICD-10-CM

## 2025-01-29 DIAGNOSIS — R87.610 ASCUS OF CERVIX WITH NEGATIVE HIGH RISK HPV: ICD-10-CM

## 2025-01-29 DIAGNOSIS — Z12.11 SCREENING FOR COLON CANCER: ICD-10-CM

## 2025-01-29 PROCEDURE — G0476 HPV COMBO ASSAY CA SCREEN: HCPCS | Performed by: OBSTETRICS & GYNECOLOGY

## 2025-01-29 PROCEDURE — S0612 ANNUAL GYNECOLOGICAL EXAMINA: HCPCS | Performed by: OBSTETRICS & GYNECOLOGY

## 2025-01-29 PROCEDURE — G0145 SCR C/V CYTO,THINLAYER,RESCR: HCPCS | Performed by: OBSTETRICS & GYNECOLOGY

## 2025-01-29 RX ORDER — DICLOFENAC SODIUM 75 MG/1
75 TABLET, DELAYED RELEASE ORAL
COMMUNITY
Start: 2025-01-14 | End: 2026-01-14

## 2025-01-29 RX ORDER — LIDOCAINE 50 MG/G
1 PATCH TOPICAL EVERY 24 HOURS
COMMUNITY
Start: 2025-01-02 | End: 2026-01-02

## 2025-01-29 RX ORDER — FAMOTIDINE 20 MG/1
1 TABLET, FILM COATED ORAL DAILY
COMMUNITY
Start: 2025-01-09

## 2025-01-29 NOTE — PROGRESS NOTES
Carlin Reese  1980      CC:  Yearly exam    S:  44 y.o. female here for yearly exam. Her cycles are regular, not heavy or crampy.     She has been experiencing a flare of her hidradenitis.  She saw dermatology at one point who gave her a steroid injection and mentioned humira.  Encouraged her to consider as her flares are becoming more frequent.      Sexual activity: She is not sexually active currently.     Contraception: She uses abstinence for contraception. If she became active, she would use condoms as she does not like hormonal contraception.  Also mentioned ParaGard.     STD testing:  She does not want STD testing today.     Last Pap 9/28/2022 - ASCUS, negative HPV; repeat today; hx LEEP >20 years ago for JESE 3  Last Mammo 5/18/2024 - right BIRAD-1, left BIRAD-0; diagnostic 7/2/2024 - BIRAD-4; biopsy 7/3/24 benign; follow up scheduled per radiology    We reviewed [facility] guidelines for Pap testing today.       Current Outpatient Medications:     acetaminophen (TYLENOL) 500 mg tablet, Take 1,000 mg by mouth, Disp: , Rfl:     clindamycin (CLINDAGEL) 1 % gel, Apply topically 2 (two) times a day, Disp: 60 g, Rfl: 1    diclofenac (VOLTAREN) 75 mg EC tablet, Take 75 mg by mouth, Disp: , Rfl:     famotidine (PEPCID) 20 mg tablet, Take 1 tablet by mouth daily, Disp: , Rfl:     lidocaine (LIDODERM) 5 %, Place 1 patch on the skin every 24 hours, Disp: , Rfl:     tiZANidine (ZANAFLEX) 2 mg tablet, , Disp: , Rfl:   Social History     Socioeconomic History    Marital status: Single     Spouse name: Not on file    Number of children: Not on file    Years of education: Not on file    Highest education level: Not on file   Occupational History    Not on file   Tobacco Use    Smoking status: Every Day     Current packs/day: 0.50     Average packs/day: 0.5 packs/day for 15.0 years (7.5 ttl pk-yrs)     Types: Cigarettes    Smokeless tobacco: Never   Vaping Use    Vaping status: Never Used   Substance and Sexual Activity     Alcohol use: Yes     Alcohol/week: 2.0 standard drinks of alcohol     Types: 2 Standard drinks or equivalent per week     Comment: Drinks Beer and Wine    Drug use: No    Sexual activity: Not Currently     Birth control/protection: Abstinence   Other Topics Concern    Not on file   Social History Narrative    Drinks Coffee: 1-2 cup/day     Social Drivers of Health     Financial Resource Strain: Low Risk  (7/9/2024)    Received from [facility]    Overall Financial Resource Strain (CARDIA)     Difficulty of Paying Living Expenses: Not very hard   Food Insecurity: Food Insecurity Present (7/9/2024)    Received from [facility]    Hunger Vital Sign     Worried About Running Out of Food in the Last Year: Never true     Ran Out of Food in the Last Year: Sometimes true   Transportation Needs: No Transportation Needs (7/9/2024)    Received from [facility]    PRAPARE - Transportation     Lack of Transportation (Medical): No     Lack of Transportation (Non-Medical): No   Physical Activity: Insufficiently Active (4/17/2023)    Received from [facility]    Exercise Vital Sign     Days of Exercise per Week: 1 day     Minutes of Exercise per Session: 10 min   Stress: No Stress Concern Present (4/17/2023)    Received from [facility], [facility]    [employer] - Occupational Stress Questionnaire     Feeling of Stress : Only a little   Social Connections: Feeling Socially Integrated (7/9/2024)    Received from [facility]    OASIS : Social Isolation     How often do you feel lonely or isolated from those around you?: Never   Intimate Partner Violence: Not At Risk (7/9/2024)    Received from [facility]    Humiliation, Afraid, Rape, and Kick questionnaire     Fear of Current or Ex-Partner: No     Emotionally Abused: No     Physically Abused: No     Sexually  "Abused: No   Housing Stability: Unknown (2024)    Received from [facility]    Housing Stability Vital Sign     Unable to Pay for Housing in the Last Year: No     Number of Times Moved in the Last Year: Not on file     Homeless in the Last Year: No     Family History   Problem Relation Age of Onset    Hypertension Mother     Heart attack Mother     Varicose Veins Mother     Stroke Mother     Breast cancer Mother         Not my mother, my mother’s sister    Alzheimer's disease Maternal Grandmother     Dementia Maternal Grandmother     Varicose Veins Maternal Grandmother     Breast cancer Maternal Aunt         unknown age-with mets    Melanoma Maternal Aunt         Malignant -- Skin    No Known Problems Father     No Known Problems Daughter     Lung cancer Maternal Grandfather         unknown age    No Known Problems Paternal Grandmother     No Known Problems Paternal Grandfather     No Known Problems Son     No Known Problems Paternal Aunt     No Known Problems Paternal Aunt       Past Medical History:   Diagnosis Date    Abnormal Pap smear of cervix          JESE III with severe dysplasia     Dysplasia of cervix, low grade (JESE 1)     Hidradenitis suppurativa     last assessed 12    Mastodynia     Severe dysplasia of cervix         Review of Systems   Respiratory: Negative.    Cardiovascular: Negative.    Gastrointestinal: Negative for constipation and diarrhea.   Genitourinary: Negative for difficulty urinating, pelvic pain, vaginal bleeding, vaginal discharge, itching or odor.    O:  Blood pressure 100/70, height 5' 5.5\" (1.664 m), weight 89.8 kg (198 lb), last menstrual period 2025, not currently breastfeeding.    Patient appears well and is not in distress  Neck is supple without masses  Breasts are symmetrical without mass, tenderness, nipple discharge, skin changes or adenopathy.   Abdomen is soft and nontender without masses.   External genitals are normal without " lesions or rashes.  Urethral meatus and urethra are normal  Bladder is normal to palpation  Vagina is normal without discharge or bleeding.   Cervix is normal without discharge or lesion.   Uterus is normal, mobile, nontender without palpable mass.  Adnexa are normal, nontender, without palpable mass.     A:   Yearly exam.     P:   Pap with HPV sent - will call with results   Mammo scheduled   Colonoscopy referral given    RTO one year for yearly exam or sooner as needed.

## 2025-02-03 LAB
LAB AP GYN PRIMARY INTERPRETATION: NORMAL
Lab: NORMAL

## 2025-02-04 ENCOUNTER — RESULTS FOLLOW-UP (OUTPATIENT)
Dept: OBGYN CLINIC | Facility: CLINIC | Age: 45
End: 2025-02-04

## 2025-05-23 ENCOUNTER — HOSPITAL ENCOUNTER (OUTPATIENT)
Dept: MAMMOGRAPHY | Facility: CLINIC | Age: 45
Discharge: HOME | End: 2025-05-23
Payer: COMMERCIAL

## 2025-05-23 ENCOUNTER — RESULTS FOLLOW-UP (OUTPATIENT)
Dept: LABOR AND DELIVERY | Facility: HOSPITAL | Age: 45
End: 2025-05-23

## 2025-05-23 VITALS — HEIGHT: 66 IN | WEIGHT: 198 LBS | BODY MASS INDEX: 31.82 KG/M2

## 2025-05-23 DIAGNOSIS — Z12.31 VISIT FOR SCREENING MAMMOGRAM: ICD-10-CM

## 2025-05-23 PROCEDURE — 77067 SCR MAMMO BI INCL CAD: CPT

## 2025-05-23 PROCEDURE — 77063 BREAST TOMOSYNTHESIS BI: CPT

## 2025-05-23 NOTE — TELEPHONE ENCOUNTER
Patient called back, reviewed providers recommendations.  She is interested in a breast MRI.  She does want provider to update her family history.  It is only her aunt (mothers sister) who had breast cancer, not her mother.